# Patient Record
Sex: FEMALE
[De-identification: names, ages, dates, MRNs, and addresses within clinical notes are randomized per-mention and may not be internally consistent; named-entity substitution may affect disease eponyms.]

---

## 2018-11-01 ENCOUNTER — HOSPITAL ENCOUNTER (INPATIENT)
Dept: HOSPITAL 92 - 2NO | Age: 53
LOS: 2 days | Discharge: HOME | DRG: 812 | End: 2018-11-03
Attending: FAMILY MEDICINE | Admitting: FAMILY MEDICINE
Payer: COMMERCIAL

## 2018-11-01 VITALS — BODY MASS INDEX: 29.4 KG/M2

## 2018-11-01 DIAGNOSIS — Z87.891: ICD-10-CM

## 2018-11-01 DIAGNOSIS — K44.9: ICD-10-CM

## 2018-11-01 DIAGNOSIS — L25.9: ICD-10-CM

## 2018-11-01 DIAGNOSIS — D50.0: Primary | ICD-10-CM

## 2018-11-01 DIAGNOSIS — F32.9: ICD-10-CM

## 2018-11-01 DIAGNOSIS — Z88.0: ICD-10-CM

## 2018-11-01 DIAGNOSIS — K29.60: ICD-10-CM

## 2018-11-01 DIAGNOSIS — Q21.1: ICD-10-CM

## 2018-11-01 DIAGNOSIS — K64.8: ICD-10-CM

## 2018-11-01 DIAGNOSIS — K57.30: ICD-10-CM

## 2018-11-01 LAB
ALBUMIN SERPL BCG-MCNC: 4.2 G/DL (ref 3.5–5)
ALP SERPL-CCNC: 109 U/L (ref 40–150)
ALT SERPL W P-5'-P-CCNC: 34 U/L (ref 8–55)
ANION GAP SERPL CALC-SCNC: 11 MMOL/L (ref 10–20)
ANISOCYTOSIS BLD QL SMEAR: (no result) (100X)
AST SERPL-CCNC: 31 U/L (ref 5–34)
BILIRUB SERPL-MCNC: (no result) MG/DL (ref 0.2–1.2)
BUN SERPL-MCNC: 11 MG/DL (ref 9.8–20.1)
CALCIUM SERPL-MCNC: 9.7 MG/DL (ref 7.8–10.44)
CHLORIDE SERPL-SCNC: 105 MMOL/L (ref 98–107)
CO2 SERPL-SCNC: 24 MMOL/L (ref 22–29)
CREAT CL PREDICTED SERPL C-G-VRATE: 112 ML/MIN (ref 70–130)
FERRITIN SERPL-MCNC: 10.72 NG/ML (ref 10–291)
GLOBULIN SER CALC-MCNC: 3.9 G/DL (ref 2.4–3.5)
GLUCOSE SERPL-MCNC: 133 MG/DL (ref 70–105)
HGB BLD-MCNC: 6.7 G/DL (ref 12–16)
IRON SERPL-MCNC: 8 UG/DL (ref 50–170)
MCH RBC QN AUTO: 18.7 PG (ref 27–31)
MCV RBC AUTO: 67.9 FL (ref 78–98)
MDIFF COMPLETE?: YES
MICROCYTES BLD QL SMEAR: (no result) (100X)
OVALOCYTES BLD QL SMEAR: (no result) (100X)
PLATELET # BLD AUTO: 808 THOU/UL (ref 130–400)
PLATELET BLD QL SMEAR: (no result)
POIKILOCYTOSIS BLD QL SMEAR: (no result) (100X)
POLYCHROMASIA BLD QL SMEAR: (no result) (100X)
POTASSIUM SERPL-SCNC: 4.2 MMOL/L (ref 3.5–5.1)
RBC # BLD AUTO: 3.56 MILL/UL (ref 4.2–5.4)
REFLEX FOR REVIEW??: YES
SODIUM SERPL-SCNC: 136 MMOL/L (ref 136–145)
STOMATOCYTES BLD QL SMEAR: (no result) (100X)
TARGETS BLD QL SMEAR: (no result) (100X)
UIBC SERPL-MCNC: 513 MCG/DL (ref 265–497)
WBC # BLD AUTO: 4.9 THOU/UL (ref 4.8–10.8)

## 2018-11-01 PROCEDURE — 86901 BLOOD TYPING SEROLOGIC RH(D): CPT

## 2018-11-01 PROCEDURE — 80053 COMPREHEN METABOLIC PANEL: CPT

## 2018-11-01 PROCEDURE — 88312 SPECIAL STAINS GROUP 1: CPT

## 2018-11-01 PROCEDURE — 85025 COMPLETE CBC W/AUTO DIFF WBC: CPT

## 2018-11-01 PROCEDURE — 86850 RBC ANTIBODY SCREEN: CPT

## 2018-11-01 PROCEDURE — 36415 COLL VENOUS BLD VENIPUNCTURE: CPT

## 2018-11-01 PROCEDURE — 93005 ELECTROCARDIOGRAM TRACING: CPT

## 2018-11-01 PROCEDURE — 86900 BLOOD TYPING SEROLOGIC ABO: CPT

## 2018-11-01 PROCEDURE — 93306 TTE W/DOPPLER COMPLETE: CPT

## 2018-11-01 PROCEDURE — 85060 BLOOD SMEAR INTERPRETATION: CPT

## 2018-11-01 PROCEDURE — 83550 IRON BINDING TEST: CPT

## 2018-11-01 PROCEDURE — 30233N1 TRANSFUSION OF NONAUTOLOGOUS RED BLOOD CELLS INTO PERIPHERAL VEIN, PERCUTANEOUS APPROACH: ICD-10-PCS | Performed by: FAMILY MEDICINE

## 2018-11-01 PROCEDURE — 83880 ASSAY OF NATRIURETIC PEPTIDE: CPT

## 2018-11-01 PROCEDURE — P9016 RBC LEUKOCYTES REDUCED: HCPCS

## 2018-11-01 PROCEDURE — 83540 ASSAY OF IRON: CPT

## 2018-11-01 PROCEDURE — 93010 ELECTROCARDIOGRAM REPORT: CPT

## 2018-11-01 PROCEDURE — 82274 ASSAY TEST FOR BLOOD FECAL: CPT

## 2018-11-01 PROCEDURE — 88305 TISSUE EXAM BY PATHOLOGIST: CPT

## 2018-11-01 PROCEDURE — 36430 TRANSFUSION BLD/BLD COMPNT: CPT

## 2018-11-01 PROCEDURE — 84145 PROCALCITONIN (PCT): CPT

## 2018-11-01 PROCEDURE — 82728 ASSAY OF FERRITIN: CPT

## 2018-11-01 RX ADMIN — BETAMETHASONE VALERATE SCH APPLIC: 1.2 CREAM TOPICAL at 21:38

## 2018-11-01 NOTE — EKG
Test Reason : 

Blood Pressure : ***/*** mmHG

Vent. Rate : 083 BPM     Atrial Rate : 083 BPM

   P-R Int : 154 ms          QRS Dur : 090 ms

    QT Int : 382 ms       P-R-T Axes : 069 045 041 degrees

   QTc Int : 448 ms

 

Normal sinus rhythm

Normal ECG

No previous ECGs available

Confirmed by TIERRA HYDE ([id number]) on 11/1/2018 3:49:57 PM

 

Referred By:  MAVERICK           Confirmed By:TIERRA HYDE

## 2018-11-01 NOTE — PDOC.FPRHP
- History of Present Illness


Chief Complaint: rash, new murmur


History of Present Illness: 


This is a 52 y/o F PMHx depression and hemorrhoids who presented to her PCP's 

office this AM complaining of rash on her palms that started last Thursday. She 

reports that they are irritated, not pruritic. She endorses throbbing pain in 

her bilateral hands that was relieved with OTC ibuprofen/tylenol. She reports 

some blistering. She endorses associated fatigue at that time as well. She 

endorses a subjective fever on 10/31. She has been started on topical steroids, 

prednisone, and oral bactrim for the rash on 10/29 and it improved. 





She endorses a h/o bleeding hemorrhoid. She states that she hasn't had a GI 

workup for this for the past 2 years. She is unsure if she has been anemic 

before due to this, but has bleeding on and off. She states that she has been 

having more blood in her stool the past 2 weeks that it is on the toilet paper, 

not really intermixed with the stool. 





She reports that she has had poor dentition for several years and a h/o 

multiple dental infections. She says most recently was about a month ago, but 

she didn't get treated with abx as it went away on its own and she doesn't have 

the money for dental care. 


ED Course: 


Direct admission from Dr. Agustin Nieto. Labs performed in his office: WBC 16K, 

ESR 69, Hb 7, normal MARGARET, normal TFT, normal bilirubin. She was found to have a 

new systolic murmur on his exam





- Allergies/Adverse Reactions


 Allergies











Allergy/AdvReac Type Severity Reaction Status Date / Time


 


Penicillins Allergy  Hives Verified 11/01/18 11:08














- Home Medications


 











 Medication  Instructions  Recorded  Confirmed  Type


 


Sulfamethoxazole/Trimethoprim 1 tablet PO BID 11/01/18 11/01/18 History





[Sulfamethoxazole/TMP DS]    


 


Triamcinolone Acetonide [Kenalog 1 applic TOP BID 11/01/18 11/01/18 History





0.5% Cream]    


 


predniSONE 20 mg PO BID 11/01/18 11/01/18 History














- History


PMHx: hemorrhoids, cholelithiasis


 


PSHx: had a cyst removed as a child





FHx: Father - CAD, DM, HTN


Mother - skin cancer


Cousins - breast cancer


Aunt - cervical cancer


 


Social: Former tobacco use 37 pack years, quit 4 years ago. Denies EtOH, drug 

use. 


 


PCP: Dr. Agustin Nieto





- Review of Systems


General: reports: fever/chills (subjective), fatigue


Eyes: denies: eye pain, vision changes


ENT: denies: nasal congestion, rhinorrhea


Respiratory: reports: shortness of breath (associated with physical activity 

for over a year).  denies: cough


Cardiovascular: reports: chest pain (occasional sharp/intermittent CP, chronic)

.  denies: palpitation, edema


Gastrointestinal: reports: GI bleeding (chronic hemorrhoidal bleeding).  denies

: nausea, vomiting, abdominal pain


Genitourinary: denies: incontinence, dysuria


Skin: reports: rashes.  denies: itching


Musculoskeletal: reports: swelling.  denies: arthritis/arthralgias


Neurological: denies: syncope, weakness





- Vital signs


BP: 143/76  HR: 95 RR: 18 Tmax: 98.6 Pox: 97% on RA  Wt: 85.6 kg   








- Physical Exam


Constitutional: NAD, awake, alert and oriented


HEENT: normocephalic and atraumatic, PERRLA, EOMI, conjunctiva clear, no 

scleral icterus, grossly normal vision, grossly normal hearing, normal nasal 

mucosa, MMM, other (poor dentition, conjunctival pallor)


Neck: supple, no thyromegaly


-Heart: 


RRR, 2/6 systolic murmur, trace pitting edema on bilateral LE to the mid-tibia


Lungs: CTAB, no respiratory distress, good air movement, no rales/rhonchi, no 

wheezing


Abdomen: soft, non-tender, bowel sounds present, no masses/distention, no 

hernias, other (Rectal exam: 2 large non-bleeding external hemorrhoids, smaller 

internal hemorrhoids, soft stool in vault, no fissures, no blood noted)


Musculoskeletal: normal structure, normal tone


Neurological: no focal deficit, CN II-XII intact, normal sensation


-Skin: 


erythematous maculopapular rash on palm/thenar prominence bilaterally. 2.5 cm 

blood-filled, flaccid bullae on dorsal aspect of the L thumb. Multiple purpuric 

lesions on fingertips, more on L hand


Psychiatric: normal mood and affect, good judgment and insight, intact recent 

and remote memory





FMR H&P: Results





- Labs


Result Diagrams: 


 11/01/18 13:19





 11/01/18 13:19





FMR H&P: A/P





- Problem List


(1) Newly recognized heart murmur


Current Visit: Yes   Status: Acute   Code(s): R01.1 - CARDIAC MURMUR, 

UNSPECIFIED   





(2) Contact dermatitis


Current Visit: Yes   Status: Acute   Code(s): L25.9 - UNSPECIFIED CONTACT 

DERMATITIS, UNSPECIFIED CAUSE   


Qualifiers: 


   Contact dermatitis type: unspecified   Contact dermatitis trigger: 

unspecified trigger   Qualified Code(s): L25.9 - Unspecified contact dermatitis

, unspecified cause   





(3) Anemia


Current Visit: Yes   Status: Acute   Code(s): D64.9 - ANEMIA, UNSPECIFIED   


Qualifiers: 


   Anemia type: unspecified type   Qualified Code(s): D64.9 - Anemia, 

unspecified   





- Plan


This is a 52 y/o F with h/o bleeding hemorrhoid who presented from clinic due 

to anemia, rash, new onset murmur





1. Microcytic Anemia


Per PCP lab results, pt hemaglobin was 7. She reports that she has been taking 

OTC iron supplement the past few weeks because she has felt more fatigued. She 

reports a hemorrhoid that bleeds. Rectal exam with large non-bleeding external 

hemorrhoids. 


-CBC, if < 7 will consider transfusion vs iron infusion


-Iron studies


-Peripheral smear


-FOBT


-Hold oral steroids, NSAIDs


-PPI for possible gastritis





2. New Systolic Murmur


Pt with newly identified 2/6 systolic murmur. Likely flow murmur 2/2 anemia. 


-Will check echo


-BNP due to risk factors, trace edema, ALEXANDER





3. Rash


Pt has rash that is likely contact dermatitis, but there was concern from PCP 

about endocarditis. She had an elevated ESR in clinic. 


-Continue high potency topical steroid


-Check procalcitonin and if elevated, will get Blood cultures x2





GI ppx: protonix


VTE ppx: SCD's


Code Status: Full





Disposition/LOS: 


Admit to [facility], length of stay likely > 2 years





Attending Addendum





- Attending Addendum


Date/Time: 11/01/18 3594





I personally evaluated the patient and discussed the management with Dr. Adams


I agree with the History, Examination, Assessment and Plan documented above 

with any addition or exceptions noted below.





54 yo female transferred from clinic due to concerns related to blood work. 


Patient reports recent history of blistering, swellingl, red rash to bilateral 

hands. Originally occurred on Thursday. Worsened on Sunday. Seen by PCP x2 for 

blood work and treatment.  Improved with steroids (oral and topical) and 

bactrim. Reports pain to left thumb due to significant swelling. Notes blood/

red filled blister now to dorsum of left thumb near nail bed. Has recently 

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 No other exposures per 

patient. Reports poor dentition but last pulpitis/infection treated was 2 years 

ago.  





Reports symptoms of fatigue and ALEXANDER with bleeding hemorrhoids and NSAID 

gastritis. No previous GI workup per patient. Has taken oral steroids and 

ibuprofen recently. 





pmhx: Hemorrhoids, nephrolithasis


sxhx: Left breast biopsy


famhx: breast cancer, HTN, CVD, DM, skin cancer


sohx: From [place], past tobacco use, social alcohol use, no drug use





PE: Well appearing. Blood filled blister to dorsum of left thumb that is 

compressible and 2.5 cm in longest diameter. II/Vi systolic likely flow murmur. 

LSB. Nonradiating.





Labs reviewed from PCP office.





1. Microcytic anemia with known GI etiologies: Will repeat labs and workup. 

That this time no s/sx noting need for transfusion at this time. Continue to 

trend vitals. Asymptomatic currently. Will likely need iron infusion and GI 

workup. Rectal exam to be done with FOB testing. 


2. Rash: Recently started new job with new exposures to hand . Has 

greatly improved with steroid and bactrim. Pain no longer present. Continue 

topical treatment. Does not appear infectious but more likely allergic. 


3. Systolic murmur: Asymptomatic. Possibly flow. Concerned raised for possible 

endocarditis but no risk factors known. No embolic, no fevers, no IV drug use. 

Will continue to monitor due to initial concern. Will check procal. ECHO to 

evaluate structure. Add BNP due to symptoms. 





[place]

## 2018-11-02 LAB
HGB BLD-MCNC: 7.6 G/DL (ref 12–16)
MCH RBC QN AUTO: 20 PG (ref 27–31)
MCV RBC AUTO: 70 FL (ref 78–98)
MDIFF COMPLETE?: YES
MICROCYTES BLD QL SMEAR: (no result) (100X)
PLATELET # BLD AUTO: 759 THOU/UL (ref 130–400)
PLATELET BLD QL SMEAR: (no result)
RBC # BLD AUTO: 3.82 MILL/UL (ref 4.2–5.4)
WBC # BLD AUTO: 6 THOU/UL (ref 4.8–10.8)

## 2018-11-02 RX ADMIN — BETAMETHASONE VALERATE SCH DRP: 1.2 CREAM TOPICAL at 09:36

## 2018-11-02 RX ADMIN — BETAMETHASONE VALERATE SCH DRP: 1.2 CREAM TOPICAL at 21:44

## 2018-11-02 RX ADMIN — SULFAMETHOXAZOLE AND TRIMETHOPRIM SCH TAB: 800; 160 TABLET ORAL at 21:44

## 2018-11-02 NOTE — CON
DATE OF CONSULTATION:  11/02/2018

 

REQUESTING PHYSICIAN:  Dr. Keila Villalta.

 

REASON FOR CONSULTATION:  GI bleeding and anemia.

 

HISTORY OF PRESENT ILLNESS:  Danita Feng is a very pleasant 53-year-old woman.  She has a history sign
ificant only for depression and gallstones back in 2016, my colleague, Dr. Kimball evaluated her for an
emia and rectal bleeding.  He performed an EGD and a colonoscopy in 11/2006.  The EGD was normal exce
pt for a small hiatal hernia.  The colonoscopy showed a single diverticulum at the splenic flexure, a
nd grade II internal and external hemorrhoids, which were felt to be the source of her recent bleedin
g.  He had recommended a repeat colonoscopy at a 10-year interval and had referred her to Dr. Melendez
 for hemorrhoidal surgery.  The patient underwent stapled hemorrhoidectomy in early 2017.  She did no
t follow up since that time.

 

She reports that over the years since then, she has really continued to have a quite frequent rectal 
bleeding with bowel movements.  This is usually bright red blood, though sometimes it is quite a bit 
darker.  Usually this will occur with bowel movements, usually will happen for a couple of weeks at a
 time and then subside for a couple of weeks at a time.  On rare occasion when she is feeling fullnes
s and discomfort in the area, she will have spontaneous bleeding without bowel movements.  She has no
t been taking any oral iron supplementation and did not realize she was anemic.

 

She was admitted to the hospital yesterday after evaluation in her primary care physician's office.  
She had a new palmar rash and blistering.  She had been feeling a lot of fatigue and was having some 
subjective fevers and also had a new systolic heart murmur.  Laboratory studies demonstrated profound
 anemia with hemoglobin 6.7, elevated sedimentation rate, she is admitted to the hospital for further
 evaluation.  Recent treatment with Bactrim and prednisone has really improved the rash.  She receive
d 1 unit of RBC transfusion last night and hemoglobin came up from 6.7-7.6.  The iron studies do conf
irm iron deficiency.  The patient has no symptoms of nausea, vomiting, abdominal pain or weight loss.


 

REVIEW OF SYSTEMS:  Full review of systems including constitutional, head, eyes, ears, nose, throat, 
GI, , cardiovascular, respiratory, musculoskeletal, and neurologic systems is negative except as no
duong in the HPI.

 

ALLERGIES:  PENICILLIN.

 

OUTPATIENT MEDICATIONS:  Bactrim, prednisone 20 mg b.i.d.

 

PAST MEDICAL HISTORY:  Depression, gallstones, grade II hemorrhoids status post a stapled hemorrhoide
ctomy in early 2007.

 

SOCIAL HISTORY:  She quit smoking 4 years ago.  No alcohol or drug abuse.

 

FAMILY HISTORY:  Her aunt had cervical cancer, cousins had breast cancer, and mother had skin cancer.


 

PHYSICAL EXAMINATION:

VITAL SIGNS:  Temperature 98.5, pulse 89, blood pressure 129/79, 100% oxygen saturation on room air.

GENERAL:  A 53-year-old  woman sitting up in bed comfortably, in no acute distress.

SKIN:  She is a bit pale, no jaundice.  She has a blistering rash to the hands bilaterally which she 
says is improving significantly.

EYES:  No scleral icterus.  Extraocular movements intact.

ENT:  Mucous membranes moist, no oral lesions.

LYMPH:  No submandibular or supraclavicular lymphadenopathy.

THYROID:  Nontender to palpation.

HEART:  Regular rate and rhythm.

LUNGS:  Clear to auscultation bilaterally.

ABDOMEN:  Bowel sounds present, soft, nontender to palpation throughout.

EXTREMITIES:  No peripheral edema.

VESSELS:  Radial pulses 2+ bilaterally.

NEUROLOGICAL:  Cranial nerves II-XII intact bilaterally.  No focal deficits.

RECTAL:  Deferred until colonoscopy.

 

LABORATORY STUDIES:  Initial hemoglobin 6.7, MCV low at 67.9.  After 1 unit RBC transfusion, hemoglob
in up to 7.6.  WBC is 6.0, platelets 759.  MARGARET was negative.  ESR was 69, BUN 11, creatinine 0.76.  L
FTs all normal with total bilirubin less than 0.2.  Alkaline phosphatase 109, AST 31, ALT 34, albumin
 4.2.  Iron studies show iron deficiency with ferritin 10.72.  Iron 8, TIBC elevated to 513.  BNP is 
normal at 43.4.

 

IMAGING STUDIES:  Echocardiogram demonstrated left ventricular ejection fraction of 60%-65%.

 

ASSESSMENT AND PLAN:

1.  Severe iron deficiency anemia, likely from chronic gastrointestinal blood loss.

2.  Rectal bleeding, chronic for years.

3.  Hemorrhoids with prior history of stapled hemorrhoidectomy in early 2007.  The pattern of the pat
ient's ongoing bleeding as well as chronicity for years, all do seem consistent with ongoing intermit
tent hemorrhoidal bleeding.  I discussed with the patient that usually hemorrhoidal bleeding does not
 result in significant iron deficiency anemia like this, but it certainly can particularly if it gone
 on for so many years.  That being said, need to rule out other sources, more proximal lesions that h
er last endoscopic workup was over 12 years ago.  We will plan to proceed with EGD and colonoscopy th
is admission after bowel preparation tonight.  Depending on findings, the patient may need to visit w
josephine Melendez again about addressing these hemorrhoids surgically, but will see what the endoscopy 
shows.  The patient desires to proceed.

 

Thank you for the consultation.  Please call any time with questions or concerns.

## 2018-11-02 NOTE — PDOC.FM
- Subjective


Subjective: 


Patient doing well this AM. No complaints today. No adverse events overnight.





- Objective


MAR Reviewed: Yes


Vital Signs & Weight: 


 Vital Signs (12 hours)











  Temp Pulse Resp BP Pulse Ox


 


 11/02/18 07:50  98.1 F  68  16  126/75  99


 


 11/02/18 04:00  97.9 F  75  16  134/71  95


 


 11/02/18 00:00  98 F  75  18  132/77  97








 Weight











Weight                         82.781 kg











 Most Recent Monitor Data











Heart Rate from ECG            78














I&O: 


 











 11/01/18 11/02/18 11/03/18





 06:59 06:59 06:59


 


Intake Total  1550 


 


Output Total  1625 


 


Balance  -75 











Result Diagrams: 


 11/02/18 05:08





 11/01/18 13:19





<Keila Villalta - Last Filed: 11/02/18 14:12>





- Objective


Vital Signs & Weight: 


 Weight











Weight                         82.191 kg











 Most Recent Monitor Data











Heart Rate from ECG            78














Result Diagrams: 


 11/03/18 04:08





 11/01/18 13:19





<Ford Steve - Last Filed: 11/05/18 10:47>





Phys Exam





- Physical Examination


Constitutional: NAD


Cardiovascular: RRR


Gastrointestinal: soft, non-tender


Musculoskeletal: no edema


Psychiatric: normal affect, A&O x 3


Deviation from normal: hemorrhagic blister on l. thumb slightly opened; 

erythematous pruritic rash





<Keila Villalta - Last Filed: 11/02/18 14:12>





Dx/Plan


(1) Anemia


Code(s): D64.9 - ANEMIA, UNSPECIFIED   Status: Acute   


Qualifiers: 


   Anemia type: unspecified type   Qualified Code(s): D64.9 - Anemia, 

unspecified   





(2) Contact dermatitis


Code(s): L25.9 - UNSPECIFIED CONTACT DERMATITIS, UNSPECIFIED CAUSE   Status: 

Acute   


Qualifiers: 


   Contact dermatitis type: unspecified   Contact dermatitis trigger: 

unspecified trigger   Qualified Code(s): L25.9 - Unspecified contact dermatitis

, unspecified cause   





(3) Newly recognized heart murmur


Code(s): R01.1 - CARDIAC MURMUR, UNSPECIFIED   Status: Acute   





- Plan


Plan: 





Newly recognized heart murmur


- likely secondary to profound anemia.


- initially admitted due to concern for infective endocarditis, however with 

negative echo, and procalcitonin of 0.04, and fulfilment of 1 [facility] minor 

criteria the likelihood of IE is very low.





Contact dermatitis/dyshidrotic eczema


- continue high dose steroid cream





Iron deficiency anemia.


- hemoglobin uptrended to 6.7


- likely secondary to lower GI bleeding.


- iron infusion ordered





Lower GI bleeding


- external and internal hemorrhoids noted on exam.


- will discuss with GI due to profound anemia/blood loss. May plan for 

intervention in hospital vs. outpt.











<Keila Villalta - Last Filed: 11/02/18 14:12>





Attending Addendum





- Attending Addendum


Date/Time: 11/05/18 1047





I personally evaluated the patient and discussed the management with Dr. Villalta 

on 11/2/18.


I agree with the History, Examination, Assessment and Plan documented above 

with any addition or exceptions noted below.








<Ford Steve - Last Filed: 11/05/18 10:47>

## 2018-11-02 NOTE — PDOC.FPRHP
- Allergies/Adverse Reactions


 Allergies











Allergy/AdvReac Type Severity Reaction Status Date / Time


 


Penicillins Allergy  Hives Verified 11/01/18 11:08














- Home Medications


 











 Medication  Instructions  Recorded  Confirmed  Type


 


Sulfamethoxazole/Trimethoprim 1 tablet PO BID 11/01/18 11/01/18 History





[Sulfamethoxazole/TMP DS]    


 


Triamcinolone Acetonide [Kenalog 1 applic TOP BID 11/01/18 11/01/18 History





0.5% Cream]    


 


predniSONE 20 mg PO BID 11/01/18 11/01/18 History














- History


PMHx:


 


PSHx: 





FHx:


 


Social:


 








- Vital signs


BP: []  HR: [] RR: [] Tmax: [] Pox: []% on []  Wt: []   








FMR H&P: Results





- Labs


Result Diagrams: 


 11/02/18 05:08





 11/01/18 13:19


Lab results: 


 











WBC  6.0 thou/uL (4.8-10.8)   11/02/18  05:08    


 


Hgb  7.6 g/dL (12.0-16.0)  L  11/02/18  05:08    


 


Hct  26.7 % (36.0-47.0)  L  11/02/18  05:08    


 


MCV  70.0 fL (78.0-98.0)  L  11/02/18  05:08    


 


Plt Count  759 thou/uL (130-400)  H  11/02/18  05:08    


 


Neutrophils %  Cancelled   11/01/18  13:19    


 


Band Neuts % (Manual)  Cancelled   11/01/18  13:19    


 


Sodium  136 mmol/L (136-145)   11/01/18  13:19    


 


Potassium  4.2 mmol/L (3.5-5.1)   11/01/18  13:19    


 


Chloride  105 mmol/L ()   11/01/18  13:19    


 


Carbon Dioxide  24 mmol/L (22-29)   11/01/18  13:19    


 


BUN  11 mg/dL (9.8-20.1)   11/01/18  13:19    


 


Creatinine  0.76 mg/dL (0.6-1.1)   11/01/18  13:19    


 


Glucose  133 mg/dL ()  H  11/01/18  13:19    


 


Calcium  9.7 mg/dL (7.8-10.44)   11/01/18  13:19    


 


Total Bilirubin  Less than  0.2 mg/dL (0.2-1.2)  L  11/01/18  13:19    


 


AST  31 U/L (5-34)   11/01/18  13:19    


 


ALT  34 U/L (8-55)   11/01/18  13:19    


 


Alkaline Phosphatase  109 U/L ()   11/01/18  13:19    


 


B-Natriuretic Peptide  43.4 pg/mL (0-100)   11/01/18  13:19    


 


Serum Total Protein  8.1 g/dL (6.0-8.3)   11/01/18  13:19    


 


Albumin  4.2 g/dL (3.5-5.0)   11/01/18  13:19    














FMR H&P: Upper Level





- Plan


Date/Time: 11/02/18 0800








I, [], have evaluated this patient and agree with findings/plan as outlined by 

intern resident. Pertinent changes/additions are listed here.

## 2018-11-03 VITALS — DIASTOLIC BLOOD PRESSURE: 69 MMHG | TEMPERATURE: 98.2 F | SYSTOLIC BLOOD PRESSURE: 118 MMHG

## 2018-11-03 LAB
BASOPHILS # BLD AUTO: 0.1 THOU/UL (ref 0–0.2)
BASOPHILS NFR BLD AUTO: 1.6 % (ref 0–1)
EOSINOPHIL # BLD AUTO: 0.1 THOU/UL (ref 0–0.7)
EOSINOPHIL NFR BLD AUTO: 2.5 % (ref 0–10)
HGB BLD-MCNC: 7.9 G/DL (ref 12–16)
LYMPHOCYTES # BLD: 2.5 THOU/UL (ref 1.2–3.4)
LYMPHOCYTES NFR BLD AUTO: 43 % (ref 21–51)
MCH RBC QN AUTO: 20.3 PG (ref 27–31)
MCV RBC AUTO: 71 FL (ref 78–98)
MONOCYTES # BLD AUTO: 0.7 THOU/UL (ref 0.11–0.59)
MONOCYTES NFR BLD AUTO: 11.6 % (ref 0–10)
NEUTROPHILS # BLD AUTO: 2.4 THOU/UL (ref 1.4–6.5)
NEUTROPHILS NFR BLD AUTO: 41.3 % (ref 42–75)
PLATELET # BLD AUTO: 784 THOU/UL (ref 130–400)
RBC # BLD AUTO: 3.92 MILL/UL (ref 4.2–5.4)
WBC # BLD AUTO: 5.8 THOU/UL (ref 4.8–10.8)

## 2018-11-03 PROCEDURE — 0DB98ZX EXCISION OF DUODENUM, VIA NATURAL OR ARTIFICIAL OPENING ENDOSCOPIC, DIAGNOSTIC: ICD-10-PCS | Performed by: INTERNAL MEDICINE

## 2018-11-03 PROCEDURE — 0DJD8ZZ INSPECTION OF LOWER INTESTINAL TRACT, VIA NATURAL OR ARTIFICIAL OPENING ENDOSCOPIC: ICD-10-PCS | Performed by: INTERNAL MEDICINE

## 2018-11-03 RX ADMIN — SULFAMETHOXAZOLE AND TRIMETHOPRIM SCH TAB: 800; 160 TABLET ORAL at 09:40

## 2018-11-03 RX ADMIN — BETAMETHASONE VALERATE SCH APPLIC: 1.2 CREAM TOPICAL at 09:40

## 2018-11-03 NOTE — OP
DATE OF PROCEDURE:  11/03/2018

 

SURGEON:  Eldon Houston M.D.

 

ASSISTANT SURGEON:  None.

 

PROCEDURES PERFORMED:

1.  EGD with biopsies.

2.  Colonoscopy, diagnostic.

 

INDICATIONS:

1.  Severe iron deficiency anemia.

2.  Rectal bleeding.

3.  History of hemorrhoids with prior stapled hemorrhoidectomy in 2006.

 

MEDICATIONS:  See anesthesia record.

 

FINDINGS:  After discussion of the risks, benefits and alternatives of the procedure, informed consen
t was obtained and witnessed.  Pre-endoscopic cardiopulmonary examination was satisfactory.

 

DESCRIPTION OF PROCEDURE:  Timeout was performed before sedation was achieved.  Sedation was achieved
 with anesthesia assistance in the endoscopy unit.  A Pentax adult upper endoscope was placed into th
e oropharynx and passed through the cricopharyngeus under direct visualization.  The esophageal mucos
a appeared normal throughout.  The endoscope was then advanced into the stomach.  Forward and retrofl
exed views of the entire gastric mucosa were obtained.  There is a small 1-2 cm hiatal hernia.  In th
e gastric antrum, there are a few shallow erosions with no evidence of hemorrhage.  Biopsies were obt
ained from the gastric antrum and body to rule out H. pylori infection.  The endoscope was advanced t
hrough the pylorus and into the first and second portions of the duodenum, which appeared normal.  I 
did obtain duodenal biopsies to rule out celiac disease.  The upper endoscope was completely withdraw
n and the patient was repositioned.

 

Digital rectal exam was performed, which demonstrated multiple large external hemorrhoids and hemorrh
oidal skin tag.  A Pentax adult colonoscope was inserted into the anus and passed forward to the cecu
m in the usual fashion.  The cecal base was identified by the appendiceal orifice as well as the ileo
cecal valve.  The terminal ileum was intubated and the ileal mucosa appeared normal.  The colonoscope
 was slowly withdrawn in a gradual and circumferential manner with careful examination of the entire 
colonic mucosa.  The quality of the preparation was good.  There was no evidence of any old blood or 
active bleeding throughout the colon.  The colonic mucosa appeared normal throughout.  There are a fe
w scattered diverticula throughout the colon.  Retroflexion in the rectum demonstrated large internal
 hemorrhoids.  The colonoscope was completely withdrawn and the patient allowed to recover.  The abraham
ent tolerated the procedure well.  There were no immediate post-procedure complications.

 

IMPRESSION:

1.  Mild erosive gastritis in the antrum, biopsied to rule out Helicobacter pylori.

2.  Small hiatal hernia.

3.  Normal duodenal mucosa, biopsied to rule out celiac disease.

4.  Large internal and external hemorrhoids.

5.  A few scattered colonic diverticula.

6.  Otherwise, normal colonoscopy to the terminal ileum.

 

RECOMMENDATIONS:

1.  I would treat the patient with Protonix 40 mg by mouth once daily for 1 month.

2.  Follow up results of the gastric biopsies and duodenal biopsies.

3.  Repeat colonoscopy for screening purposes in 10 years.

4.  Stool softener as needed.

5.  I would recommend General Surgery referral for chronically bleeding hemorrhoids, which remained t
he likely cause of her iron deficiency anemia.  This could be on an outpatient basis.  The patient sa
w Dr. Manuelito Melendez previously.

 

GI will sign off.  Please call back anytime with questions or concerns.

## 2018-11-03 NOTE — PDOC.FM
- Subjective


Subjective: 





NAEO. Patient states she feels well this AM. Feels like her rash is improving. 

Did endorse some light bleeding w/ last 2 BMs after taking the bowel prep. Says 

the toilet bowl water was just light pink in color. No gross blood or clots 

noted. 





- Objective


MAR Reviewed: Yes


Vital Signs & Weight: 


 Vital Signs (12 hours)











  Temp Pulse Resp BP Pulse Ox


 


 11/03/18 04:00  98.0 F  81  18  120/74  96


 


 11/02/18 20:00  98.0 F  91  14  139/83  97








 Weight











Weight                         82.781 kg











 Most Recent Monitor Data











Heart Rate from ECG            78














I&O: 


 











 11/01/18 11/02/18 11/03/18





 06:59 06:59 06:59


 


Intake Total  1550 


 


Output Total  1625 


 


Balance  -75 











Result Diagrams: 


 11/03/18 04:08





 11/01/18 13:19





<Zoya Viramontes - Last Filed: 11/03/18 08:24>





- Objective


Vital Signs & Weight: 


 Weight











Weight                         82.191 kg











 Most Recent Monitor Data











Heart Rate from ECG            78














Result Diagrams: 


 11/03/18 04:08





 11/01/18 13:19





<Rebeca Champion - Last Filed: 11/05/18 13:04>





Phys Exam





- Physical Examination


Constitutional: NAD


HEENT: moist MMs


Neck: supple, full ROM


Respiratory: no wheezing, no rales, no rhonchi, clear to auscultation bilateral


Cardiovascular: RRR


Neurological: non-focal, normal sensation, moves all 4 limbs


Psychiatric: normal affect, A&O x 3


Skin: normal turgor, cap refill <2 seconds


Deviation from normal: hemorrhagic bulous lesion on left thumb and erythematous 

palms





<Zoya Viramontes - Last Filed: 11/03/18 08:24>





Dx/Plan


(1) Lower GI bleed


Code(s): K92.2 - GASTROINTESTINAL HEMORRHAGE, UNSPECIFIED   Status: Chronic   





(2) Anemia


Code(s): D64.9 - ANEMIA, UNSPECIFIED   Status: Chronic   


Qualifiers: 


   Anemia type: unspecified type   Qualified Code(s): D64.9 - Anemia, 

unspecified   





(3) Contact dermatitis


Code(s): L25.9 - UNSPECIFIED CONTACT DERMATITIS, UNSPECIFIED CAUSE   Status: 

Acute   


Qualifiers: 


   Contact dermatitis type: unspecified   Contact dermatitis trigger: 

unspecified trigger   Qualified Code(s): L25.9 - Unspecified contact dermatitis

, unspecified cause   





(4) Newly recognized heart murmur


Code(s): R01.1 - CARDIAC MURMUR, UNSPECIFIED   Status: Acute   





- Plan


Plan: 





Newly recognized systolic heart murmur


- Likely a benign flow murmur secondary to profound anemia.


- Initial concern for infective endocarditis; however negative echo and 

procalcitonin of 0.04 makes this very unlikely. 


- Will continue to monitor.





Contact dermatitis/dyshidrotic eczema


- Will continue high dose steroid cream & bactrim BID.





Iron deficiency anemia


- Hemoglobin uptrended to 7.9 this AM s/p iron infusion yesterday.


- Likely secondary to chronic lower GI bleeding from hemorrhoids.


- EGD and colonoscopy today with Dr. Houston to investigate other potential 

sources for anemia.





Lower GI bleeding


- Patient has a h/o hemorroidectomy and had new external and internal 

hemorrhoids noted on initial exam.


- EGD and colonoscopy today to investigate other potential bleeding sources. 


- Will continue protonix. 











<Zoya Viramontes - Last Filed: 11/03/18 08:24>





Attending Addendum





- Attending Addendum


Date/Time: 11/03/18 6649





I personally evaluated the patient and discussed the management with Dr. Viramontes


I agree with the History, Examination, Assessment and Plan documented above 

with any addition or exceptions noted below.





54 yo female with history of GI bleeding admitted for symptomatic anemia


HD#3


Doing well. No complaints overnight. Endo procedures performed. Ready for D/c. 


1. Microcytic anemia with known GI etiologies: s/p 1 u pRBCs. s/p Iron 

infusion. PPI x 1 month. Outpatient hemorrhoidectomy needed. Decrease NSAID 

use. 


2. Contact dermitits: Continue topical steroids. R


3. Patent PFO: Needs follow up with cards for bubble study and CT surg 

followup. Patient with risk for CVA due to PFO. 





ABrayMD





<Rebeca Champion - Last Filed: 11/05/18 13:04>

## 2018-11-05 NOTE — DIS-2
DATE OF ADMISSION:  November 01, 2018

 

DATE OF DISCHARGE:  November 03, 2018

 

RESIDENT:  Zoya Viramontes MD

 

ADMITTING ATTENDING:  Dr. Rebeca Champion.

 

DISCHARGE ATTENDING:  Dr. Rebeca Champion.

 

CONSULTATIONS:  Gastroenterology, Dr. Eldon Houston.

 

PROCEDURES:

1.  Echocardiogram, which showed an ejection fraction of 60%-65% with grade 1/3 diastolic dysfunction
, mildly dilated left atrium, and mild mitral and tricuspid regurgitation.  Cannot rule out small PFO
.

2.  EGD with biopsies.

3.  Colonoscopy, diagnostic

 

PRIMARY DIAGNOSES:

1.  Severe iron deficiency anemia, secondary to rectal bleeding from hemorrhoids.

2.  Internal and external hemorrhoids.

3.  Contact dermatitis.

4.  New heart murmur, secondary to a likely small patent foramen ovale.

 

SECONDARY DIAGNOSIS:  Depression.

 

DISCHARGE MEDICATIONS:

1.  Bactrim 800 mg/160 mg tablet 1 tablet p.o. b.i.d.

2.  Triamcinolone acetonide 15 grams to 1 application topically b.i.d.

3.  Acetaminophen 650 mg p.o. every 4 hours p.r.n. for pain.

4.  Protonix 40 mg p.o. daily.

5.  Betamethasone 0.1% cream applied topically b.i.d.

 

DISCONTINUED MEDICATIONS:  Prednisone 20 mg p.o. b.i.d.

 

HISTORY OF PRESENT ILLNESS AND HOSPITAL COURSE:  The patient is a 53-year-old  female with a
 past medical history significant for depression and hemorrhoids, status post hemorrhoidectomy, who p
resented to her primary care physician's office the morning of presentation complaining of a rash on 
her palms, that started last Thursday.  She stated that her hands were irritated, but not pruritic an
d that she had throbbing pain in them.  Routine blood work in her PCP's office was significant for wh
ite blood cell count of 16,000 and ESR of 69, a hemoglobin of 7, normal MARGARET, normal PFT, and normal b
ilirubin.  Of note, she was also found to have a new systolic murmur on his physical exam.  The patie
nt was directly admitted from, her PCP, Dr. Agustin Nieto's office in order to be worked up for possi
ble endocarditis.  She, therefore, had an echocardiogram later that evening, which was significant on
ly for some grade 1/3 diastolic dysfunction and some mild valvular regurgitation.  No vegetations wer
e noted.  However, it was stated that a small PFO could not be ruled out.

 

By later that afternoon, repeat blood work was obtained, which was significant for hemoglobin of 6.7 
and an MCV of 67.9.  The patient, therefore, had a unit of packed red blood cells, transfused, and al
so received an iron infusion.  The following day, Gastroenterology, Dr. Eldon Houston, was consulted to 
come and evaluate the patient and he elected to perform an EGD and colonoscopy in order to see if the
re was any other explanation for her severe iron deficiency anemia other than the already known hemor
rhoids.  Thus, on day 2 of hospitalization, the patient underwent bowel prep and the morning of disch
arge, she underwent an EGD with biopsies and a diagnostic colonoscopy.  These procedures revealed nain
t the patient had mild erosive gastritis in the antrum of her stomach and biopsies were taken to rule
 out H. pylori.  Biopsies were also taken of her duodenum in order to rule out celiac disease.  The loree avalos was also noted to have large internal and external hemorrhoids and a few scattered diverticula
.  Dr. Houston recommended that she continue Protonix 40 mg daily for at least one month and to follow u
p with him as an outpatient for her biopsy results.  He also recommended a general surgery referral, 
as she would likely need to undergo a second hemorrhoidectomy surgery in order to correct her severe 
iron deficiency anemia.  Regarding the patient's contact dermatitis and new murmur, a procalcitonin w
as also obtained, which was within normal limits at 0.04.  Therefore, this, in combination with the loree avalos's normal echo, essentially ruled out endocarditis is a possibility.  It was therefore determin
ed that her rash on her hands was likely contact dermatitis secondary to some homemade soaps.  She wa
s, therefore, continued on topical steroid cream and Bactrim prescribed by her PCP. 

 

Monitoring for several hours after her EGD and colonoscopy, the patient was, therefore, cleared for d
ischarge home in stable condition.

 

DISPOSITION:  Stable.

 

DISCHARGE INSTRUCTIONS:

1.  Location:  Home.

2.  Diet:  Regular diet.

3.  Activity:  The patient's activity as tolerated.  No restrictions.

4.  Followup:  The patient was instructed to follow up with her primary care provider, Dr. Agustin call, within 1 week of discharge.  She was also instructed to follow up with Gastroenterology, Dr. Ivan
id Case, within 2 weeks of discharge as well as Dr. Manuelito Melendez with General Surgery within 2 wee
ks of discharge.